# Patient Record
Sex: FEMALE | HISPANIC OR LATINO | Employment: OTHER | URBAN - METROPOLITAN AREA
[De-identification: names, ages, dates, MRNs, and addresses within clinical notes are randomized per-mention and may not be internally consistent; named-entity substitution may affect disease eponyms.]

---

## 2019-02-05 ENCOUNTER — OFFICE VISIT (OUTPATIENT)
Dept: FAMILY MEDICINE CLINIC | Facility: CLINIC | Age: 79
End: 2019-02-05
Payer: MEDICARE

## 2019-02-05 VITALS
TEMPERATURE: 96 F | HEIGHT: 63 IN | RESPIRATION RATE: 18 BRPM | SYSTOLIC BLOOD PRESSURE: 118 MMHG | DIASTOLIC BLOOD PRESSURE: 70 MMHG | OXYGEN SATURATION: 98 % | HEART RATE: 78 BPM | BODY MASS INDEX: 38.27 KG/M2 | WEIGHT: 216 LBS

## 2019-02-05 DIAGNOSIS — H90.3 SENSORINEURAL HEARING LOSS (SNHL) OF BOTH EARS: Primary | ICD-10-CM

## 2019-02-05 PROBLEM — E11.9 TYPE 2 DIABETES MELLITUS (HCC): Status: ACTIVE | Noted: 2019-02-05

## 2019-02-05 PROBLEM — E03.9 HYPOTHYROIDISM: Status: ACTIVE | Noted: 2019-02-05

## 2019-02-05 PROCEDURE — 99213 OFFICE O/P EST LOW 20 MIN: CPT | Performed by: FAMILY MEDICINE

## 2019-02-05 RX ORDER — ATORVASTATIN CALCIUM 80 MG/1
TABLET, FILM COATED ORAL
COMMUNITY
End: 2019-02-05 | Stop reason: ALTCHOICE

## 2019-02-05 RX ORDER — NAPROXEN 500 MG/1
1 TABLET ORAL EVERY 12 HOURS
COMMUNITY
Start: 2015-05-13 | End: 2019-02-05 | Stop reason: ALTCHOICE

## 2019-02-05 RX ORDER — LEVOTHYROXINE SODIUM 175 UG/1
TABLET ORAL
COMMUNITY

## 2019-02-05 RX ORDER — INSULIN GLARGINE 100 [IU]/ML
INJECTION, SOLUTION SUBCUTANEOUS
COMMUNITY

## 2019-02-05 RX ORDER — AMLODIPINE BESYLATE 5 MG/1
TABLET ORAL
COMMUNITY
End: 2019-02-05 | Stop reason: ALTCHOICE

## 2019-02-05 RX ORDER — LANCETS 30 GAUGE
EACH MISCELLANEOUS
COMMUNITY
Start: 2015-05-13

## 2019-02-05 NOTE — PROGRESS NOTES
Assessment/Plan:     Diagnoses and all orders for this visit:    Sensorineural hearing loss (SNHL) of both ears  -     Ambulatory referral to Audiology; Future  - Rinne test: decreased bone conduction, Velasquez test: hearing equal b/l   - patient advised to follow up with Audiology for full work up and possible hearing aid  - patient advised to return if audiology can not provide her with hearing aid  Subjective:      Patient ID: Lex Lehman is a 77 y o  female  HPI  78 y/o F with PMH type 2 DM, Hypothyroidism, HTN,  presents today to the office complaining of diminishing hearing B/L  Patient states she has noticed symptoms since one year ago and has been getting worse  Denies any ringing or buzzing noise in her ear  Denies Loud noise exposure or Trauma  Denies ear pain or discharge  Denies Dizziness or light headedness  Patient states she had cataract surgery in [facility] 2 years ago, and has slight diminished vision since then bilaterally  The following portions of the patient's history were reviewed and updated as appropriate: allergies, current medications, past family history, past medical history, past social history, past surgical history and problem list     Review of Systems   Constitutional: Negative for activity change, appetite change, chills, fatigue and fever  HENT: Positive for hearing loss  Negative for ear discharge, ear pain, sinus pain, sore throat and tinnitus  Bilaterally   Eyes: Negative for visual disturbance  Respiratory: Negative for apnea, cough, chest tightness and shortness of breath  Cardiovascular: Negative for chest pain, palpitations and leg swelling  Gastrointestinal: Negative for abdominal pain, constipation, diarrhea, nausea and vomiting  Genitourinary: Negative for decreased urine volume, difficulty urinating, dysuria, flank pain, frequency, pelvic pain, urgency, vaginal bleeding, vaginal discharge and vaginal pain     Musculoskeletal: Negative for arthralgias, back pain, gait problem and joint swelling  Neurological: Negative for dizziness, light-headedness and headaches  Hematological: Negative for adenopathy  Does not bruise/bleed easily  Psychiatric/Behavioral: Negative for agitation  Objective:      /70 (BP Location: Left arm, Patient Position: Sitting, Cuff Size: Adult)   Pulse 78   Temp (!) 96 °F (35 6 °C) (Tympanic)   Resp 18   Ht 5' 3" (1 6 m)   Wt 98 kg (216 lb)   SpO2 98%   BMI 38 26 kg/m²          Physical Exam   Constitutional: She is oriented to person, place, and time  She appears well-developed and well-nourished  HENT:   Head: Normocephalic and atraumatic  Right Ear: External ear normal    Left Ear: External ear normal    - cerumen R ear canal   Cardiovascular: Normal rate, regular rhythm and normal heart sounds  Pulmonary/Chest: Effort normal and breath sounds normal  No respiratory distress  She has no wheezes  She exhibits no tenderness  Abdominal: Soft  Bowel sounds are normal  She exhibits no distension and no mass  There is no tenderness  Musculoskeletal: Normal range of motion  Lymphadenopathy:     She has no cervical adenopathy  Neurological: She is alert and oriented to person, place, and time  Skin: Skin is warm

## 2019-02-12 ENCOUNTER — OFFICE VISIT (OUTPATIENT)
Dept: FAMILY MEDICINE CLINIC | Facility: CLINIC | Age: 79
End: 2019-02-12
Payer: MEDICARE

## 2019-02-12 VITALS
HEART RATE: 98 BPM | DIASTOLIC BLOOD PRESSURE: 70 MMHG | RESPIRATION RATE: 18 BRPM | OXYGEN SATURATION: 97 % | BODY MASS INDEX: 38.49 KG/M2 | WEIGHT: 217.3 LBS | SYSTOLIC BLOOD PRESSURE: 128 MMHG

## 2019-02-12 DIAGNOSIS — J01.00 ACUTE NON-RECURRENT MAXILLARY SINUSITIS: Primary | ICD-10-CM

## 2019-02-12 DIAGNOSIS — J98.8 CONGESTION OF RESPIRATORY TRACT: ICD-10-CM

## 2019-02-12 PROCEDURE — 1036F TOBACCO NON-USER: CPT | Performed by: FAMILY MEDICINE

## 2019-02-12 PROCEDURE — 99213 OFFICE O/P EST LOW 20 MIN: CPT | Performed by: FAMILY MEDICINE

## 2019-02-12 PROCEDURE — 1160F RVW MEDS BY RX/DR IN RCRD: CPT | Performed by: FAMILY MEDICINE

## 2019-02-12 RX ORDER — GUAIFENESIN 600 MG
1200 TABLET, EXTENDED RELEASE 12 HR ORAL EVERY 12 HOURS SCHEDULED
Qty: 20 TABLET | Refills: 1 | Status: SHIPPED | OUTPATIENT
Start: 2019-02-12

## 2019-02-12 RX ORDER — FLUTICASONE PROPIONATE 50 MCG
2 SPRAY, SUSPENSION (ML) NASAL DAILY
Qty: 1 BOTTLE | Refills: 5 | Status: SHIPPED | OUTPATIENT
Start: 2019-02-12

## 2019-02-12 NOTE — PROGRESS NOTES
Subjective     Damaris Ramirez is a 77 y o  female who presents for evaluation of sinus pain  Symptoms include: cough, nasal congestion and sneezing  Onset of symptoms was 2 days ago  Symptoms have been gradually worsening since that time  Past history is significant for no recent history of pneumonia or bronchitis  Patient is a non-smoker  Denies fevers, chills, chest pain, sob, change in sleep, change in appetite, n/v/c/d, abdominal pain or any other /GI symptoms  Patient believes it is allergies, but daughter who is in the room believes patient is sick because daugther is sick with URI symptoms for approx 1 week  The following portions of the patient's history were reviewed and updated as appropriate: allergies, current medications, past family history, past medical history, past social history, past surgical history and problem list     Review of Systems  Pertinent items are noted in HPI       Objective     /70 (BP Location: Left arm, Patient Position: Sitting, Cuff Size: Extra-Large)   Pulse 98   Resp 18   Wt 98 6 kg (217 lb 4 8 oz)   SpO2 97%   BMI 38 49 kg/m²   General appearance: alert and oriented, in no acute distress, cooperative and morbidly obese  Eyes: conjunctivae/corneas clear  PERRL, EOM's intact  Fundi benign  Ears: normal TM's and external ear canals both ears  Nose: Nares normal  Septum midline  Mucosa normal  Scant rhinorrhea    Throat: lips, mucosa, and tongue normal; teeth and gums normal  Neck: no adenopathy, no carotid bruit, no JVD, supple, symmetrical, trachea midline and thyroid not enlarged, symmetric, no tenderness/mass/nodules  Lungs: clear to auscultation bilaterally  Heart: regular rate and rhythm, S1, S2 normal and systolic ejection murmur  Abdomen: soft, non-tender; bowel sounds normal; no masses,  no organomegaly  Skin: Skin color, texture, turgor normal  No rashes or lesions  Lymph nodes: Cervical, supraclavicular, and axillary nodes normal     Assessment/Plan Acute viral vs allergic sinusitis  Congestion of respiratory tract    · Nasal saline sprays  · Flonase 2 sprays qd  Advised patient that she may continue to use this daily if she believes that she has allergy symptoms  · Mucinex 1200 mg q12h prn congestion  · OTC Antipyretics as needed  · Reviewed concerning signs/symptoms to watch out for, and indications for calling back or returning to office  All patient questions & concerns were addressed  The patient agrees with her treatment plan  RTO prn  Ja Silva DO  02/12/19  9:29 AM    Some portions of this record may have been generated with voice recognition software  There may be translation, syntax, or grammatical errors  Occasional wrong word or "sound-a-like" substitutions may have occurred due to the inherent limitations of the voice recognition software  Read the chart carefully and recognize, using context, where substations may have occurred   If you have any questions, please contact the dictating provider for clarification or correction, as needed

## 2019-02-15 ENCOUNTER — OFFICE VISIT (OUTPATIENT)
Dept: AUDIOLOGY | Facility: CLINIC | Age: 79
End: 2019-02-15
Payer: MEDICARE

## 2019-02-15 DIAGNOSIS — H90.3 SENSORY HEARING LOSS, BILATERAL: Primary | ICD-10-CM

## 2019-02-15 PROCEDURE — 92557 COMPREHENSIVE HEARING TEST: CPT | Performed by: AUDIOLOGIST

## 2019-02-15 NOTE — LETTER
February 15, 2019     Ivone Salamanca MD  [employer]  [address] [facility] [address]    Patient: Kvng Leigh   YOB: 1940   Date of Visit: 2/15/2019       Dear Dr Blancas Mendes: Thank you for referring Kvng Leigh to me for evaluation  Below are my notes for this consultation  If you have questions, please do not hesitate to call me  I look forward to following your patient along with you  Sincerely,        Megha Can        CC: MD Megha Hastings  2/15/2019  2:46 PM  Sign at close encounter  [address] VISIT    Name:  Kvng Leigh  :  1940  Age:  77 y o  Date of Evaluation: 02/15/19     History: reduced hearing over past 3 years, obtained hearing aids in [facility]  Reason for visit: Kvng Leigh is being seen today at the request of Dr Roberto Gaviria for an evaluation of hearing  Patient was accompanied by her daughter, who served as   Patient reports she got hearing aids in [facility] but has never heard well from them, and they are not currently working  Patient has [employer] MH20 BTE hearing aids with slim tubes, s/n [id number] (R) and [id number]  Warranty is      Otoscopic Evaluation:   Right Ear: Moderate cerumen   Left Ear: Minimum cerumen     Tympanometry:   Right: CNT - could not obtain seal   Left: CNT - could not obtain seal    Audiogram Results:  Mild to moderately severe sensorineural hearing loss bilaterally  WRS was not tested due to language barrier  *see attached audiogram      Action:  Replaced clogged slim tubes on both hearing aids  Changed dome from large open to large closed  Hearing aid was set at 70% of gain  Increased to 80% according to patient comfort  Sound quality is good and patient is very pleased  RECOMMENDATIONS:  Return in 1 to 2 months for HAV or as needed          Megha Can, Audiology Intern  Naila Adkins , [address] [facility]  Clinical Audiologist

## 2019-02-15 NOTE — PROGRESS NOTES
HEARING EVALUATION/HEARING AID VISIT    Name:  eHrson Singh  :  1940  Age:  77 y o  Date of Evaluation: 02/15/19     History: reduced hearing over past 3 years, obtained hearing aids in [facility]  Reason for visit: Herson Singh is being seen today at the request of Dr Andie Chaidez for an evaluation of hearing  Patient was accompanied by her daughter, who served as   Patient reports she got hearing aids in [facility] but has never heard well from them, and they are not currently working  Patient has [employer] MH20 BTE hearing aids with slim tubes, s/n [id number] (R) and [id number]  Warranty is      Otoscopic Evaluation:   Right Ear: Moderate cerumen   Left Ear: Minimum cerumen     Tympanometry:   Right: CNT - could not obtain seal   Left: CNT - could not obtain seal    Audiogram Results:  Mild to moderately severe sensorineural hearing loss bilaterally  WRS was not tested due to language barrier  *see attached audiogram      Action:  Replaced clogged slim tubes on both hearing aids  Changed dome from large open to large closed  Hearing aid was set at 70% of gain  Increased to 80% according to patient comfort  Sound quality is good and patient is very pleased  RECOMMENDATIONS:  Return in 1 to 2 months for HAV or as needed          Carlos Devlin, Audiology Intern  Naila Piña , [employer]  Clinical Audiologist